# Patient Record
Sex: FEMALE | Race: BLACK OR AFRICAN AMERICAN | NOT HISPANIC OR LATINO | URBAN - METROPOLITAN AREA
[De-identification: names, ages, dates, MRNs, and addresses within clinical notes are randomized per-mention and may not be internally consistent; named-entity substitution may affect disease eponyms.]

---

## 2017-01-23 ENCOUNTER — OUTPATIENT (OUTPATIENT)
Dept: OUTPATIENT SERVICES | Facility: HOSPITAL | Age: 22
LOS: 1 days | End: 2017-01-23
Payer: COMMERCIAL

## 2017-01-23 PROCEDURE — 86480 TB TEST CELL IMMUN MEASURE: CPT

## 2017-01-23 PROCEDURE — 36415 COLL VENOUS BLD VENIPUNCTURE: CPT

## 2017-03-16 ENCOUNTER — APPOINTMENT (OUTPATIENT)
Dept: OBGYN | Facility: CLINIC | Age: 22
End: 2017-03-16

## 2017-03-16 VITALS
DIASTOLIC BLOOD PRESSURE: 72 MMHG | HEART RATE: 55 BPM | BODY MASS INDEX: 44.83 KG/M2 | WEIGHT: 253 LBS | SYSTOLIC BLOOD PRESSURE: 116 MMHG | HEIGHT: 63 IN | OXYGEN SATURATION: 98 %

## 2017-03-16 DIAGNOSIS — N64.4 MASTODYNIA: ICD-10-CM

## 2017-03-16 DIAGNOSIS — N63 UNSPECIFIED LUMP IN BREAST: ICD-10-CM

## 2017-03-16 PROBLEM — Z00.00 ENCOUNTER FOR PREVENTIVE HEALTH EXAMINATION: Noted: 2017-03-16

## 2017-03-29 ENCOUNTER — FORM ENCOUNTER (OUTPATIENT)
Age: 22
End: 2017-03-29

## 2017-03-30 ENCOUNTER — APPOINTMENT (OUTPATIENT)
Dept: ULTRASOUND IMAGING | Facility: HOSPITAL | Age: 22
End: 2017-03-30

## 2017-03-30 ENCOUNTER — OUTPATIENT (OUTPATIENT)
Dept: OUTPATIENT SERVICES | Facility: HOSPITAL | Age: 22
LOS: 1 days | End: 2017-03-30
Payer: COMMERCIAL

## 2017-03-30 PROCEDURE — 76641 ULTRASOUND BREAST COMPLETE: CPT

## 2017-04-18 ENCOUNTER — APPOINTMENT (OUTPATIENT)
Dept: OBGYN | Facility: CLINIC | Age: 22
End: 2017-04-18

## 2017-04-19 ENCOUNTER — FORM ENCOUNTER (OUTPATIENT)
Age: 22
End: 2017-04-19

## 2017-04-19 ENCOUNTER — RESULT REVIEW (OUTPATIENT)
Age: 22
End: 2017-04-19

## 2017-04-20 ENCOUNTER — OUTPATIENT (OUTPATIENT)
Dept: OUTPATIENT SERVICES | Facility: HOSPITAL | Age: 22
LOS: 1 days | End: 2017-04-20
Payer: COMMERCIAL

## 2017-04-20 PROCEDURE — 19084 BX BREAST ADD LESION US IMAG: CPT | Mod: RT

## 2017-04-20 PROCEDURE — 19083 BX BREAST 1ST LESION US IMAG: CPT | Mod: RT

## 2017-04-20 PROCEDURE — 88305 TISSUE EXAM BY PATHOLOGIST: CPT | Mod: 26

## 2017-04-20 PROCEDURE — 19084 BX BREAST ADD LESION US IMAG: CPT

## 2017-04-20 PROCEDURE — A4648: CPT

## 2017-04-20 PROCEDURE — 19083 BX BREAST 1ST LESION US IMAG: CPT

## 2017-04-20 PROCEDURE — 88305 TISSUE EXAM BY PATHOLOGIST: CPT

## 2017-05-23 ENCOUNTER — APPOINTMENT (OUTPATIENT)
Dept: OBGYN | Facility: CLINIC | Age: 22
End: 2017-05-23

## 2017-05-23 VITALS
SYSTOLIC BLOOD PRESSURE: 110 MMHG | BODY MASS INDEX: 44.3 KG/M2 | HEART RATE: 108 BPM | WEIGHT: 250 LBS | HEIGHT: 63 IN | RESPIRATION RATE: 18 BRPM | OXYGEN SATURATION: 98 % | DIASTOLIC BLOOD PRESSURE: 60 MMHG

## 2018-06-29 ENCOUNTER — APPOINTMENT (OUTPATIENT)
Dept: INTERNAL MEDICINE | Facility: CLINIC | Age: 23
End: 2018-06-29

## 2018-07-06 ENCOUNTER — APPOINTMENT (OUTPATIENT)
Dept: INTERNAL MEDICINE | Facility: CLINIC | Age: 23
End: 2018-07-06
Payer: COMMERCIAL

## 2018-07-06 VITALS — RESPIRATION RATE: 15 BRPM | HEART RATE: 72 BPM | SYSTOLIC BLOOD PRESSURE: 112 MMHG | DIASTOLIC BLOOD PRESSURE: 60 MMHG

## 2018-07-06 VITALS — WEIGHT: 250 LBS | BODY MASS INDEX: 44.29 KG/M2

## 2018-07-06 VITALS — HEIGHT: 63 IN

## 2018-07-06 DIAGNOSIS — N64.1 FAT NECROSIS OF BREAST: ICD-10-CM

## 2018-07-06 DIAGNOSIS — Z00.00 ENCOUNTER FOR GENERAL ADULT MEDICAL EXAMINATION W/OUT ABNORMAL FINDINGS: ICD-10-CM

## 2018-07-06 PROCEDURE — 99385 PREV VISIT NEW AGE 18-39: CPT

## 2018-07-06 NOTE — HEALTH RISK ASSESSMENT
[No falls in past year] : Patient reported no falls in the past year [0] : 2) Feeling down, depressed, or hopeless: Not at all (0) [[id number]] : 0

## 2018-07-06 NOTE — ASSESSMENT
[FreeTextEntry1] : Physical examination reveals an obese woman in no acute distress her weight was 250 pounds height was 5 foot 3 inches BMI 44.29 blood pressure 112/60 pulse is 72 respirations are 14 HEENT was unremarkable chest was cardiovascular exam showed no extra heart sounds or murmurs abdomen was soft and nontender extremities showed no clubbing cyanosis or edema neurologic exam was nonfocal... we will obtain immunization records from her pediatrician sometime spent discussing obesity and weight management . She will contemplate initiating a gym membership and also convincing her mind to eating less as the way to go. Slip was given for full blood tests including STDs. She was told the importance of setting up a visit to Dr. Ibanez her gynecologist for a Pap smear

## 2018-07-06 NOTE — HISTORY OF PRESENT ILLNESS
[FreeTextEntry1] : Comes to the office as a new patient to review her medications and discuss her overall health she is transitioning from her pediatrician [de-identified] : 22-year-old woman comes to the office as a new patient usually followed by her pediatrician. She has a past history of her recent biopsy of her breast showing fat necrosis and of morbid obesity she denies chest pain shortness of breath exertional dyspnea palpitations lightheadedness vertigo dizziness or syncope she has had no significant musculoskeletal complaints denies temperature chills sweats or myalgias she has had no abdominal pain nausea vomiting diarrhea constipation rectal bleeding or black stools she has not been exercising very much and her appetite has been good her weight has been relatively consistent at the level it is now

## 2018-07-06 NOTE — REVIEW OF SYSTEMS
[Fever] : no fever [Chills] : no chills [Fatigue] : no fatigue [Hot Flashes] : no hot flashes [Night Sweats] : no night sweats [Recent Change In Weight] : ~T no recent weight change [Discharge] : no discharge [Pain] : no pain [Redness] : no redness [Dryness] : no dryness  [Vision Problems] : no vision problems [Itching] : no itching [Earache] : no earache [Hearing Loss] : no hearing loss [Nosebleed] : no nosebleeds [Hoarseness] : no hoarseness [Nasal Discharge] : no nasal discharge [Sore Throat] : no sore throat [Postnasal Drip] : no postnasal drip [Chest Pain] : no chest pain [Palpitations] : no palpitations [Leg Claudication] : no leg claudication [Lower Ext Edema] : no lower extremity edema [Orthopnea] : no orthopnea [Paroysmal Nocturnal Dyspnea] : no paroysmal nocturnal dyspnea [Shortness Of Breath] : no shortness of breath [Wheezing] : no wheezing [Cough] : no cough [Dyspnea on Exertion] : no dyspnea on exertion [Abdominal Pain] : no abdominal pain [Nausea] : no nausea [Constipation] : no constipation [Diarrhea] : diarrhea [Vomiting] : no vomiting [Heartburn] : no heartburn [Melena] : no melena [Dysuria] : no dysuria [Incontinence] : no incontinence [Nocturia] : no nocturia [Poor Libido] : libido not poor [Hematuria] : no hematuria [Frequency] : no frequency [Vaginal Discharge] : no vaginal discharge [Dysmenorrhea] : no dysmenorrhea [Joint Pain] : no joint pain [Joint Stiffness] : no joint stiffness [Joint Swelling] : no joint swelling [Muscle Weakness] : no muscle weakness [Muscle Pain] : no muscle pain [Back Pain] : no back pain [Itching] : no itching [Mole Changes] : no mole changes [Nail Changes] : no nail changes [Hair Changes] : no hair changes [Skin Rash] : no skin rash [Headache] : no headache [Dizziness] : no dizziness [Fainting] : no fainting [Confusion] : no confusion [Memory Loss] : no memory loss [Unsteady Walking] : no ataxia [Suicidal] : not suicidal [Insomnia] : no insomnia [Anxiety] : no anxiety [Depression] : no depression [Easy Bleeding] : no easy bleeding [Easy Bruising] : no easy bruising [Swollen Glands] : no swollen glands

## 2020-07-19 ENCOUNTER — TRANSCRIPTION ENCOUNTER (OUTPATIENT)
Age: 25
End: 2020-07-19

## 2021-05-19 ENCOUNTER — APPOINTMENT (OUTPATIENT)
Dept: ORTHOPEDIC SURGERY | Facility: CLINIC | Age: 26
End: 2021-05-19
Payer: COMMERCIAL

## 2021-05-19 ENCOUNTER — NON-APPOINTMENT (OUTPATIENT)
Age: 26
End: 2021-05-19

## 2021-05-19 PROCEDURE — 99072 ADDL SUPL MATRL&STAF TM PHE: CPT

## 2021-05-19 PROCEDURE — 29405 APPL SHORT LEG CAST: CPT | Mod: RT

## 2021-05-19 PROCEDURE — 99204 OFFICE O/P NEW MOD 45 MIN: CPT | Mod: 25

## 2021-06-21 ENCOUNTER — APPOINTMENT (OUTPATIENT)
Dept: ORTHOPEDIC SURGERY | Facility: CLINIC | Age: 26
End: 2021-06-21
Payer: COMMERCIAL

## 2021-06-21 VITALS — HEIGHT: 63 IN | WEIGHT: 250 LBS | BODY MASS INDEX: 44.3 KG/M2

## 2021-06-21 PROCEDURE — 99072 ADDL SUPL MATRL&STAF TM PHE: CPT

## 2021-06-21 PROCEDURE — 99213 OFFICE O/P EST LOW 20 MIN: CPT

## 2021-07-12 ENCOUNTER — APPOINTMENT (OUTPATIENT)
Dept: ORTHOPEDIC SURGERY | Facility: CLINIC | Age: 26
End: 2021-07-12
Payer: COMMERCIAL

## 2021-07-12 PROCEDURE — 99072 ADDL SUPL MATRL&STAF TM PHE: CPT

## 2021-07-12 PROCEDURE — 99213 OFFICE O/P EST LOW 20 MIN: CPT

## 2021-10-07 ENCOUNTER — APPOINTMENT (OUTPATIENT)
Dept: ORTHOPEDIC SURGERY | Facility: CLINIC | Age: 26
End: 2021-10-07
Payer: COMMERCIAL

## 2021-10-07 PROCEDURE — 99214 OFFICE O/P EST MOD 30 MIN: CPT

## 2022-01-28 ENCOUNTER — OUTPATIENT (OUTPATIENT)
Dept: OUTPATIENT SERVICES | Facility: HOSPITAL | Age: 27
LOS: 1 days | End: 2022-01-28
Payer: COMMERCIAL

## 2022-01-28 ENCOUNTER — APPOINTMENT (OUTPATIENT)
Dept: MRI IMAGING | Facility: HOSPITAL | Age: 27
End: 2022-01-28
Payer: COMMERCIAL

## 2022-01-28 ENCOUNTER — NON-APPOINTMENT (OUTPATIENT)
Age: 27
End: 2022-01-28

## 2022-01-28 ENCOUNTER — APPOINTMENT (OUTPATIENT)
Dept: FAMILY MEDICINE | Facility: CLINIC | Age: 27
End: 2022-01-28
Payer: COMMERCIAL

## 2022-01-28 VITALS — SYSTOLIC BLOOD PRESSURE: 100 MMHG | DIASTOLIC BLOOD PRESSURE: 80 MMHG

## 2022-01-28 VITALS — HEART RATE: 112 BPM | TEMPERATURE: 97.6 F | OXYGEN SATURATION: 95 % | RESPIRATION RATE: 16 BRPM

## 2022-01-28 VITALS — SYSTOLIC BLOOD PRESSURE: 109 MMHG | DIASTOLIC BLOOD PRESSURE: 76 MMHG

## 2022-01-28 VITALS — WEIGHT: 218 LBS | BODY MASS INDEX: 38.62 KG/M2

## 2022-01-28 DIAGNOSIS — H93.11 TINNITUS, RIGHT EAR: ICD-10-CM

## 2022-01-28 DIAGNOSIS — Z82.49 FAMILY HISTORY OF ISCHEMIC HEART DISEASE AND OTHER DISEASES OF THE CIRCULATORY SYSTEM: ICD-10-CM

## 2022-01-28 DIAGNOSIS — Z84.89 FAMILY HISTORY OF OTHER SPECIFIED CONDITIONS: ICD-10-CM

## 2022-01-28 DIAGNOSIS — R42 DIZZINESS AND GIDDINESS: ICD-10-CM

## 2022-01-28 DIAGNOSIS — Z80.49 FAMILY HISTORY OF MALIGNANT NEOPLASM OF OTHER GENITAL ORGANS: ICD-10-CM

## 2022-01-28 PROCEDURE — A9579: CPT

## 2022-01-28 PROCEDURE — 70553 MRI BRAIN STEM W/O & W/DYE: CPT | Mod: 26

## 2022-01-28 PROCEDURE — 99204 OFFICE O/P NEW MOD 45 MIN: CPT

## 2022-01-28 PROCEDURE — 70553 MRI BRAIN STEM W/O & W/DYE: CPT

## 2022-01-29 PROBLEM — Z82.49 FAMILY HISTORY OF MYOCARDIAL INFARCTION: Status: ACTIVE | Noted: 2022-01-29

## 2022-01-29 PROBLEM — Z80.49 FAMILY HISTORY OF MALIGNANT NEOPLASM OF UTERUS: Status: ACTIVE | Noted: 2022-01-29

## 2022-01-29 PROBLEM — Z84.89 FAMILY HISTORY OF BENIGN NEOPLASM OF BRAIN: Status: ACTIVE | Noted: 2022-01-29

## 2022-01-29 PROBLEM — Z82.49 FAMILY HISTORY OF HYPERTENSION: Status: ACTIVE | Noted: 2022-01-29

## 2022-01-29 NOTE — HISTORY OF PRESENT ILLNESS
[FreeTextEntry8] : Radha presents as a new patient c/o dizziness, feeling 'off balance' +fatigue, tinnitus and hearing loss. Also c/o acid refulx. She has lost approximately 40 pounds over past several months due to nausea from acid reflux which causes decreased appetite. She had a essentially normal gadolinium enhanced MRI brain (except some mucosal thickening in sinuses) today. MRI ordered by ENT, Dr. Elton Lopez affiliated with [facility]. 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 Had her covid 19 booster 12/1/2022, Pfizer. About 2 weeks ago had one week of fevers, was tested for covid 19 and influenza via pcr, both negative. Illness resolved after 1 week. Started taking pepcid 20 mg about 4 times per week 2 weeks ago. Takes iron for heavy menses. Finds it hard to concentrate often since dizziness started, and often has headaches at the end of the day for the past 3 weeks. \par \par On ROS: Her BMs changed from daily to q 3 days normal quality however. 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

## 2022-01-29 NOTE — REVIEW OF SYSTEMS
[Patient Intake Form Reviewed] : Patient intake form was reviewed [FreeTextEntry2] : see intake form, to be scanned

## 2022-01-29 NOTE — PHYSICAL EXAM
[No Acute Distress] : no acute distress [Well Nourished] : well nourished [Well Developed] : well developed [Well-Appearing] : well-appearing [Normal Voice/Communication] : normal voice/communication [Normal Sclera/Conjunctiva] : normal sclera/conjunctiva [PERRL] : pupils equal round and reactive to light [EOMI] : extraocular movements intact [Normal Outer Ear/Nose] : the outer ears and nose were normal in appearance [Normal Oropharynx] : the oropharynx was normal [Normal TMs] : both tympanic membranes were normal [No Lymphadenopathy] : no lymphadenopathy [Supple] : supple [No Respiratory Distress] : no respiratory distress  [No Accessory Muscle Use] : no accessory muscle use [Clear to Auscultation] : lungs were clear to auscultation bilaterally [Normal Rate] : normal rate  [Regular Rhythm] : with a regular rhythm [Normal S1, S2] : normal S1 and S2 [No Edema] : there was no peripheral edema [Soft] : abdomen soft [Non Tender] : non-tender [Non-distended] : non-distended [No Masses] : no abdominal mass palpated [No HSM] : no HSM [Coordination Grossly Intact] : coordination grossly intact [No Focal Deficits] : no focal deficits [Normal Gait] : normal gait [Speech Grossly Normal] : speech grossly normal [Memory Grossly Normal] : memory grossly normal [Normal Affect] : the affect was normal [Alert and Oriented x3] : oriented to person, place, and time [Normal Mood] : the mood was normal [Normal Insight/Judgement] : insight and judgment were intact [de-identified] : no nystagmus [de-identified] : CN 2-12 grossly intact, negative Rhomberg

## 2022-01-29 NOTE — PAST MEDICAL HISTORY
[Menstruating] : hx of menstruating [Menarche Age ____] : age at menarche was [unfilled] [Definite ___ (Date)] : the last menstrual period was [unfilled] [Excessive Bleeding] : there was excessive bleeding [Total Preg ___] : G: [unfilled]

## 2022-02-03 ENCOUNTER — APPOINTMENT (OUTPATIENT)
Dept: CARDIOLOGY | Facility: CLINIC | Age: 27
End: 2022-02-03

## 2022-02-03 ENCOUNTER — NON-APPOINTMENT (OUTPATIENT)
Age: 27
End: 2022-02-03

## 2022-02-03 ENCOUNTER — LABORATORY RESULT (OUTPATIENT)
Age: 27
End: 2022-02-03

## 2022-02-03 ENCOUNTER — APPOINTMENT (OUTPATIENT)
Dept: CARDIOLOGY | Facility: CLINIC | Age: 27
End: 2022-02-03
Payer: COMMERCIAL

## 2022-02-03 VITALS
RESPIRATION RATE: 16 BRPM | OXYGEN SATURATION: 98 % | HEIGHT: 63 IN | TEMPERATURE: 97.3 F | BODY MASS INDEX: 38.45 KG/M2 | WEIGHT: 217 LBS | HEART RATE: 116 BPM

## 2022-02-03 VITALS — DIASTOLIC BLOOD PRESSURE: 82 MMHG | HEART RATE: 105 BPM | SYSTOLIC BLOOD PRESSURE: 104 MMHG

## 2022-02-03 DIAGNOSIS — R94.31 ABNORMAL ELECTROCARDIOGRAM [ECG] [EKG]: ICD-10-CM

## 2022-02-03 DIAGNOSIS — R00.0 TACHYCARDIA, UNSPECIFIED: ICD-10-CM

## 2022-02-03 PROCEDURE — 99204 OFFICE O/P NEW MOD 45 MIN: CPT

## 2022-02-03 PROCEDURE — 93000 ELECTROCARDIOGRAM COMPLETE: CPT

## 2022-02-03 NOTE — REASON FOR VISIT
[Symptom and Test Evaluation] : symptom and test evaluation [Arrhythmia/ECG Abnorrmalities] : arrhythmia/ECG abnormalities [FreeTextEntry1] : This is a 26 -year-old woman referred for cardiac evaluation. The patient was in her usual state of health until approximately October when she developed a cough. This was initially diagnosed as GERD. She then in December began having feelings of dizziness being off balance fatigue and fever. She states she was tested for Covid and was negative. She does occasionally get short of breath with walking stairs. She has had no chest pain or syncope. She states she has seen ENT physician and has had tinnitus. She states she was told she does not have vertigo. She did have an MRI of the brain which was negative. The patient has not gone for blood work recently. She has no history of hypertension diabetes smoking or alcohol use family history significant for mother with hypertension father with hypertension who had an MI in his mid to late 50s.

## 2022-02-03 NOTE — PHYSICAL EXAM
[Tachycardia] : tachycardic [S3] : an S3 was heard [General Appearance - Well Developed] : well developed [Normal Appearance] : normal appearance [Well Groomed] : well groomed [General Appearance - Well Nourished] : well nourished [No Deformities] : no deformities [General Appearance - In No Acute Distress] : no acute distress [Normal Oral Mucosa] : normal oral mucosa [No Oral Pallor] : no oral pallor [No Oral Cyanosis] : no oral cyanosis [Normal Jugular Venous A Waves Present] : normal jugular venous A waves present [Normal Jugular Venous V Waves Present] : normal jugular venous V waves present [No Jugular Venous Moreno A Waves] : no jugular venous moreno A waves [Respiration, Rhythm And Depth] : normal respiratory rhythm and effort [Exaggerated Use Of Accessory Muscles For Inspiration] : no accessory muscle use [Auscultation Breath Sounds / Voice Sounds] : lungs were clear to auscultation bilaterally [5th Left ICS - MCL] : palpated at the 5th LICS in the midclavicular line [Normal] : normal [Normal Rate] : normal [Rhythm Regular] : regular [Normal S1] : normal S1 [I] : a grade 1 [Abdomen Soft] : soft [Abdomen Tenderness] : non-tender [Abdomen Mass (___ Cm)] : no abdominal mass palpated [Abnormal Walk] : normal gait [Gait - Sufficient For Exercise Testing] : the gait was sufficient for exercise testing [Nail Clubbing] : no clubbing of the fingernails [Cyanosis, Localized] : no localized cyanosis [Petechial Hemorrhages (___cm)] : no petechial hemorrhages [Skin Color & Pigmentation] : normal skin color and pigmentation [] : no rash [No Venous Stasis] : no venous stasis [Skin Lesions] : no skin lesions [No Skin Ulcers] : no skin ulcer [No Xanthoma] : no  xanthoma was observed [Oriented To Time, Place, And Person] : oriented to person, place, and time English [Affect] : the affect was normal [Mood] : the mood was normal [No Anxiety] : not feeling anxious

## 2022-02-03 NOTE — ASSESSMENT
[FreeTextEntry1] : Patient was set up to have an echocardiogram in the office after her visit. She could not however wait to have it done and was offered an appointment but stated she didn't know when she would have time to do it.\par \par In addition she is not yet on her blood work as ordered by Dr. Dickerson because she states she "hasn't had time"\par \par In summary, the patient is a 26-year-old woman with several somatic complaints, a new onset of relative tachycardia and a new onset of an abnormal EKG. I would be concerned about a cardiac process and have expressed that to the patient.\par \par I am hopeful that she will go for a blood work and come back for her echocardiogram in a timely fashion

## 2022-02-03 NOTE — CARDIOLOGY SUMMARY
[de-identified] : 2/3/2022\par sinus tachycardia\par t wave inversions anteriorly changed from prior tracing of 2015Possibly consistent with ischemia or given agepossibly with a myocardial process

## 2022-02-08 DIAGNOSIS — K21.9 GASTRO-ESOPHAGEAL REFLUX DISEASE W/OUT ESOPHAGITIS: ICD-10-CM

## 2022-02-08 LAB
25(OH)D3 SERPL-MCNC: 5.5 NG/ML
ALBUMIN SERPL ELPH-MCNC: 3.9 G/DL
ALP BLD-CCNC: 198 U/L
ALT SERPL-CCNC: 27 U/L
ANAPLASMA PHAGOCYTOPHILIA IGG ANTIBODIES: NEGATIVE
ANAPLASMA PHAGOCYTOPHILIA IGM ANTIBODIES: NEGATIVE
ANION GAP SERPL CALC-SCNC: 12 MMOL/L
AST SERPL-CCNC: 45 U/L
BABESIA ANTIBODIES, IGG: NORMAL
BABESIA ANTIBODIES, IGM: NORMAL
BASOPHILS # BLD AUTO: 0.17 K/UL
BASOPHILS NFR BLD AUTO: 1.7 %
BILIRUB SERPL-MCNC: 0.5 MG/DL
BUN SERPL-MCNC: 6 MG/DL
CALCIUM SERPL-MCNC: 9.7 MG/DL
CHLORIDE SERPL-SCNC: 104 MMOL/L
CHOLEST SERPL-MCNC: 135 MG/DL
CO2 SERPL-SCNC: 20 MMOL/L
CREAT SERPL-MCNC: 0.91 MG/DL
EHRLICIA CHAFFEENIS IGG ANTIBODIES: NEGATIVE
EHRLICIA CHAFFEENIS IGM ANTIBODIES: NEGATIVE
EOSINOPHIL # BLD AUTO: 0.85 K/UL
EOSINOPHIL NFR BLD AUTO: 8.6 %
ESTIMATED AVERAGE GLUCOSE: 114 MG/DL
FOLATE SERPL-MCNC: 8.2 NG/ML
GLUCOSE SERPL-MCNC: 95 MG/DL
HBA1C MFR BLD HPLC: 5.6 %
HCT VFR BLD CALC: 32.3 %
HDLC SERPL-MCNC: 26 MG/DL
HGB BLD-MCNC: 9.4 G/DL
IMM GRANULOCYTES NFR BLD AUTO: 0.5 %
LDLC SERPL CALC-MCNC: 89 MG/DL
LYMPHOCYTES # BLD AUTO: 3.04 K/UL
LYMPHOCYTES NFR BLD AUTO: 30.9 %
MAN DIFF?: NORMAL
MCHC RBC-ENTMCNC: 20.3 PG
MCHC RBC-ENTMCNC: 29.1 GM/DL
MCV RBC AUTO: 69.8 FL
MONOCYTES # BLD AUTO: 1.47 K/UL
MONOCYTES NFR BLD AUTO: 14.9 %
NEUTROPHILS # BLD AUTO: 4.27 K/UL
NEUTROPHILS NFR BLD AUTO: 43.4 %
NONHDLC SERPL-MCNC: 109 MG/DL
PLATELET # BLD AUTO: 439 K/UL
POTASSIUM SERPL-SCNC: 4.1 MMOL/L
PROT SERPL-MCNC: 8 G/DL
RBC # BLD: 4.63 M/UL
RBC # FLD: 21.6 %
SODIUM SERPL-SCNC: 136 MMOL/L
TRIGL SERPL-MCNC: 97 MG/DL
TSH SERPL-ACNC: 2.01 UIU/ML
VIT B12 SERPL-MCNC: 783 PG/ML
WBC # FLD AUTO: 9.85 K/UL

## 2022-02-08 RX ORDER — FERROUS SULFATE 325(65) MG
325 (65 FE) TABLET ORAL DAILY
Qty: 90 | Refills: 0 | Status: ACTIVE | COMMUNITY
Start: 2022-02-08 | End: 1900-01-01

## 2022-02-11 ENCOUNTER — NON-APPOINTMENT (OUTPATIENT)
Age: 27
End: 2022-02-11

## 2022-03-16 ENCOUNTER — APPOINTMENT (OUTPATIENT)
Dept: FAMILY MEDICINE | Facility: CLINIC | Age: 27
End: 2022-03-16
Payer: COMMERCIAL

## 2022-03-16 VITALS
HEIGHT: 63 IN | RESPIRATION RATE: 16 BRPM | TEMPERATURE: 96.8 F | OXYGEN SATURATION: 95 % | WEIGHT: 208 LBS | HEART RATE: 98 BPM | DIASTOLIC BLOOD PRESSURE: 64 MMHG | BODY MASS INDEX: 36.86 KG/M2 | SYSTOLIC BLOOD PRESSURE: 97 MMHG

## 2022-03-16 DIAGNOSIS — R26.89 OTHER ABNORMALITIES OF GAIT AND MOBILITY: ICD-10-CM

## 2022-03-16 DIAGNOSIS — R09.81 NASAL CONGESTION: ICD-10-CM

## 2022-03-16 DIAGNOSIS — E66.01 MORBID (SEVERE) OBESITY DUE TO EXCESS CALORIES: ICD-10-CM

## 2022-03-16 DIAGNOSIS — D64.9 ANEMIA, UNSPECIFIED: ICD-10-CM

## 2022-03-16 PROCEDURE — 99214 OFFICE O/P EST MOD 30 MIN: CPT

## 2022-03-16 NOTE — HISTORY OF PRESENT ILLNESS
[FreeTextEntry8] : Presents for f/u feeling "off balance" and pain in both ears. No seasonal allergies. Ear pain on and off x since this past Thursday. +phlegm in throat, No fever, no n/v/d. Mom is sick with upper respiratory illness, so far negative for covid. Recently saw ENT, Gastroenterologist, Hematologist. Has appointment Dr. Jamie Mejia neurology, next week -[facility]. No other symptoms associated with ears feeling clogged on and off. \par \par ROS: negative except as noted above\par

## 2022-03-16 NOTE — ASSESSMENT
[FreeTextEntry1] : continue iron supplements\par blood work reviewed-anemia +\par referral to PT for balance\par MRI brain reviewed during visit with patient- ethmoid and sphenoid sinus congestion\par RTO 1-2 months for f/u

## 2022-03-16 NOTE — PHYSICAL EXAM
[No Acute Distress] : no acute distress [Well Nourished] : well nourished [Well Developed] : well developed [Well-Appearing] : well-appearing [Normal Voice/Communication] : normal voice/communication [Normal Sclera/Conjunctiva] : normal sclera/conjunctiva [Normal Outer Ear/Nose] : the outer ears and nose were normal in appearance [Normal Oropharynx] : the oropharynx was normal [No Lymphadenopathy] : no lymphadenopathy [Supple] : supple [No Respiratory Distress] : no respiratory distress  [No Accessory Muscle Use] : no accessory muscle use [Clear to Auscultation] : lungs were clear to auscultation bilaterally [Normal Rate] : normal rate  [Regular Rhythm] : with a regular rhythm [Normal S1, S2] : normal S1 and S2 [No Edema] : there was no peripheral edema [Speech Grossly Normal] : speech grossly normal [Memory Grossly Normal] : memory grossly normal [Normal Affect] : the affect was normal [Alert and Oriented x3] : oriented to person, place, and time [Normal Mood] : the mood was normal [Normal Insight/Judgement] : insight and judgment were intact [de-identified] : left TM wnl, right TM +fluid

## 2022-03-17 RX ORDER — FLUTICASONE PROPIONATE 50 UG/1
50 SPRAY, METERED NASAL TWICE DAILY
Qty: 1 | Refills: 6 | Status: DISCONTINUED | COMMUNITY
Start: 2022-03-16 | End: 2022-03-17

## 2022-03-21 ENCOUNTER — TRANSCRIPTION ENCOUNTER (OUTPATIENT)
Age: 27
End: 2022-03-21

## 2022-03-21 ENCOUNTER — APPOINTMENT (OUTPATIENT)
Dept: CT IMAGING | Facility: HOSPITAL | Age: 27
End: 2022-03-21
Payer: COMMERCIAL

## 2022-03-21 ENCOUNTER — OUTPATIENT (OUTPATIENT)
Dept: OUTPATIENT SERVICES | Facility: HOSPITAL | Age: 27
LOS: 1 days | End: 2022-03-21
Payer: COMMERCIAL

## 2022-03-21 DIAGNOSIS — Z00.8 ENCOUNTER FOR OTHER GENERAL EXAMINATION: ICD-10-CM

## 2022-03-21 PROCEDURE — 74177 CT ABD & PELVIS W/CONTRAST: CPT | Mod: 26

## 2022-03-21 PROCEDURE — 74177 CT ABD & PELVIS W/CONTRAST: CPT

## 2022-03-25 ENCOUNTER — APPOINTMENT (OUTPATIENT)
Dept: NEUROLOGY | Facility: CLINIC | Age: 27
End: 2022-03-25

## 2022-04-05 ENCOUNTER — APPOINTMENT (OUTPATIENT)
Dept: HEMATOLOGY ONCOLOGY | Facility: CLINIC | Age: 27
End: 2022-04-05

## 2022-04-13 ENCOUNTER — APPOINTMENT (OUTPATIENT)
Dept: NEUROLOGY | Facility: CLINIC | Age: 27
End: 2022-04-13

## 2022-04-19 ENCOUNTER — NON-APPOINTMENT (OUTPATIENT)
Age: 27
End: 2022-04-19

## 2022-04-19 ENCOUNTER — APPOINTMENT (OUTPATIENT)
Dept: OTOLARYNGOLOGY | Facility: CLINIC | Age: 27
End: 2022-04-19
Payer: COMMERCIAL

## 2022-04-19 VITALS
HEIGHT: 63 IN | HEART RATE: 78 BPM | RESPIRATION RATE: 15 BRPM | BODY MASS INDEX: 35.97 KG/M2 | OXYGEN SATURATION: 99 % | WEIGHT: 203 LBS | SYSTOLIC BLOOD PRESSURE: 108 MMHG | DIASTOLIC BLOOD PRESSURE: 77 MMHG

## 2022-04-19 DIAGNOSIS — R26.89 OTHER ABNORMALITIES OF GAIT AND MOBILITY: ICD-10-CM

## 2022-04-19 LAB
25(OH)D3 SERPL-MCNC: 28.8 NG/ML
BASOPHILS # BLD AUTO: 0.14 K/UL
BASOPHILS NFR BLD AUTO: 1.6 %
EOSINOPHIL # BLD AUTO: 0.4 K/UL
EOSINOPHIL NFR BLD AUTO: 4.4 %
HCT VFR BLD CALC: 33.7 %
HGB BLD-MCNC: 10.4 G/DL
IMM GRANULOCYTES NFR BLD AUTO: 0.7 %
IRON SATN MFR SERPL: 7 %
IRON SERPL-MCNC: 24 UG/DL
LYMPHOCYTES # BLD AUTO: 3.17 K/UL
LYMPHOCYTES NFR BLD AUTO: 35.1 %
MAGNESIUM SERPL-MCNC: 2.3 MG/DL
MAN DIFF?: NORMAL
MCHC RBC-ENTMCNC: 22.8 PG
MCHC RBC-ENTMCNC: 30.9 GM/DL
MCV RBC AUTO: 73.7 FL
MONOCYTES # BLD AUTO: 1.24 K/UL
MONOCYTES NFR BLD AUTO: 13.7 %
NEUTROPHILS # BLD AUTO: 4.01 K/UL
NEUTROPHILS NFR BLD AUTO: 44.5 %
PLATELET # BLD AUTO: 479 K/UL
RBC # BLD: 4.57 M/UL
RBC # FLD: 17.7 %
TIBC SERPL-MCNC: 345 UG/DL
UIBC SERPL-MCNC: 321 UG/DL
WBC # FLD AUTO: 9.02 K/UL

## 2022-04-19 PROCEDURE — 92504 EAR MICROSCOPY EXAMINATION: CPT

## 2022-04-19 PROCEDURE — 99204 OFFICE O/P NEW MOD 45 MIN: CPT

## 2022-04-19 RX ORDER — COLD-HOT PACK
125 MCG EACH MISCELLANEOUS
Qty: 90 | Refills: 1 | Status: ACTIVE | COMMUNITY
Start: 2022-04-19 | End: 1900-01-01

## 2022-04-19 RX ORDER — CHOLECALCIFEROL (VITAMIN D3) 1250 MCG
1.25 MG CAPSULE ORAL
Qty: 8 | Refills: 1 | Status: DISCONTINUED | COMMUNITY
Start: 2022-02-08 | End: 2022-04-19

## 2022-04-19 RX ORDER — MOMETASONE 50 UG/1
50 SPRAY, METERED NASAL TWICE DAILY
Qty: 1 | Refills: 6 | Status: DISCONTINUED | COMMUNITY
Start: 2022-03-17 | End: 2022-04-19

## 2022-04-20 PROBLEM — R26.89 LOSS OF BALANCE: Status: ACTIVE | Noted: 2022-03-16

## 2022-04-20 NOTE — PROCEDURE
[Vertigo] : Vertigo [Same] : same as the Pre Op Dx. [] : Binocular Microscopy [FreeTextEntry4] : none [FreeTextEntry6] : Operative microscope was used to examine the ear canal, ear drum and visible middle ear landmarks. Adequate exam would not have been possible without the use of a microscope. Findings are described.

## 2022-04-20 NOTE — REASON FOR VISIT
[Initial Consultation] : an initial consultation for [Other: _____] : [unfilled] [FreeTextEntry2] : referred by Dr. Jamie Mejia for bilateral tinnitus and dizziness

## 2022-04-20 NOTE — DATA REVIEWED
[de-identified] : I have independently reviewed the patient's audiogram from january 2022 and my findings include 6k notch bilaterally and R increased thresholds

## 2022-04-20 NOTE — HISTORY OF PRESENT ILLNESS
[de-identified] : 26 year old woman referred by Dr. Jamie Mejia for bilateral tinnitus and dizziness that started in 11/2021\par COVID vaccinated Phizer 3rd shot 12/06/2021 \par Hx--was informed she had Mono sometime in her lifetime back in 01/2022\par Reports she recently lost 60 pounds due to nausea and vomiting that started in 11/2021 and is now on Pepcid which has helped. Reports she also has an incredibly low BP, elevated HR and frequent mucus production.\par Reports intermittent bilateral otalgia when loud noises are present.\par Reports intermittent changes in hearing.\par Patient describes “dizzy” as swaying, bobbing, lightheaded. Mother reports that Dr. Mejia stated that she walks like a "drunken "-- completely unable to walk in a straight line. \par Patient feels off balance only with walking or moving\par States changes in motion, rolling in bed, getting up quickly bring it on\par Discrete episodes\par Last for a few seconds\par Reports it happens constantly when her head is not resting against something.\par Last audiogram done 01/06/2022\par Denies any prior treatments\par Denies history of headaches/migraines and motion sickness\par Denies family history of vertigo, headaches/migraines and motion sickness\par Denies otorrhea, headaches related to hearing, recent fevers or infections.

## 2022-04-21 ENCOUNTER — LABORATORY RESULT (OUTPATIENT)
Age: 27
End: 2022-04-21

## 2022-04-25 ENCOUNTER — NON-APPOINTMENT (OUTPATIENT)
Age: 27
End: 2022-04-25

## 2022-04-25 LAB
C3 SERPL-MCNC: 192 MG/DL
C4 SERPL-MCNC: 25 MG/DL
ENA SCL70 IGG SER IA-ACNC: <0.2 AL
ENA SS-A AB SER IA-ACNC: <0.2 AL
ENA SS-B AB SER IA-ACNC: <0.2 AL
ERYTHROCYTE [SEDIMENTATION RATE] IN BLOOD BY WESTERGREN METHOD: 120 MM/HR
RHEUMATOID FACT SER QL: <10 IU/ML

## 2022-04-26 ENCOUNTER — NON-APPOINTMENT (OUTPATIENT)
Age: 27
End: 2022-04-26

## 2022-04-26 LAB
ANA PAT FLD IF-IMP: ABNORMAL
ANACR T: ABNORMAL

## 2022-04-28 ENCOUNTER — TRANSCRIPTION ENCOUNTER (OUTPATIENT)
Age: 27
End: 2022-04-28

## 2022-05-09 ENCOUNTER — APPOINTMENT (OUTPATIENT)
Dept: NEUROLOGY | Facility: CLINIC | Age: 27
End: 2022-05-09

## 2022-05-12 ENCOUNTER — APPOINTMENT (OUTPATIENT)
Dept: ORTHOPEDIC SURGERY | Facility: CLINIC | Age: 27
End: 2022-05-12

## 2022-05-12 ENCOUNTER — NON-APPOINTMENT (OUTPATIENT)
Age: 27
End: 2022-05-12

## 2022-05-20 ENCOUNTER — APPOINTMENT (OUTPATIENT)
Dept: OTOLARYNGOLOGY | Facility: CLINIC | Age: 27
End: 2022-05-20
Payer: COMMERCIAL

## 2022-05-20 PROCEDURE — 92547 SUPPLEMENTAL ELECTRICAL TEST: CPT

## 2022-05-20 PROCEDURE — 92517 VEMP TEST I&R CERVICAL: CPT

## 2022-05-20 PROCEDURE — 92567 TYMPANOMETRY: CPT

## 2022-05-20 PROCEDURE — 92540 BASIC VESTIBULAR EVALUATION: CPT

## 2022-05-20 PROCEDURE — 92537 CALORIC VSTBLR TEST W/REC: CPT

## 2022-05-26 ENCOUNTER — APPOINTMENT (OUTPATIENT)
Dept: OTOLARYNGOLOGY | Facility: CLINIC | Age: 27
End: 2022-05-26
Payer: COMMERCIAL

## 2022-05-26 VITALS
WEIGHT: 203 LBS | SYSTOLIC BLOOD PRESSURE: 107 MMHG | BODY MASS INDEX: 35.97 KG/M2 | DIASTOLIC BLOOD PRESSURE: 73 MMHG | HEIGHT: 63 IN | HEART RATE: 73 BPM

## 2022-05-26 DIAGNOSIS — R42 DIZZINESS AND GIDDINESS: ICD-10-CM

## 2022-05-26 PROCEDURE — 99213 OFFICE O/P EST LOW 20 MIN: CPT

## 2022-05-26 PROCEDURE — 92504 EAR MICROSCOPY EXAMINATION: CPT

## 2022-05-26 RX ORDER — CHROMIUM 200 MCG
TABLET ORAL
Refills: 0 | Status: COMPLETED | COMMUNITY
End: 2022-05-26

## 2022-05-27 ENCOUNTER — APPOINTMENT (OUTPATIENT)
Dept: ORTHOPEDIC SURGERY | Facility: CLINIC | Age: 27
End: 2022-05-27
Payer: COMMERCIAL

## 2022-05-27 DIAGNOSIS — S86.011D STRAIN OF RIGHT ACHILLES TENDON, SUBSEQUENT ENCOUNTER: ICD-10-CM

## 2022-05-27 PROCEDURE — 99213 OFFICE O/P EST LOW 20 MIN: CPT

## 2022-05-28 PROBLEM — R42 DIZZINESS: Status: ACTIVE | Noted: 2022-01-28

## 2022-05-28 NOTE — CONSULT LETTER
[Dear  ___] : Dear  [unfilled], [Consult Letter:] : I had the pleasure of evaluating your patient, [unfilled]. [Please see my note below.] : Please see my note below. [Consult Closing:] : Thank you very much for allowing me to participate in the care of this patient.  If you have any questions, please do not hesitate to contact me. [Sincerely,] : Sincerely, [FreeTextEntry2] : Dr. Jamie Mejia [FreeTextEntry3] : Russel oJ MD, Otology Neurotology & Skull Base Surgery

## 2022-05-28 NOTE — HISTORY OF PRESENT ILLNESS
[de-identified] : 26 year old female here for follow up for  bilateral tinnitus and dizziness\par Reports tinnitus and dizziness are about the same since last visit. \par Completed VNG 5/20/2022. Results: Abnormal VNG- Bithermal water caloric irrigations yielded responses consistent  with bilateral hypofunction. \par Reports otalgia has resolved since last visit-states she feels "tolerable" discomfort \par Reports continued intermittent changes in hearing\par Reports continued swaying sensation and lightheaded throughout the day. \par Reports dizziness is worse with changes in position-resolves when patient remains still. \par Hearing is stable-Last audiogram done 01/06/2022\par Denies history of headaches/migraines and motion sickness\par Denies family history of vertigo, headaches/migraines and motion sickness\par Denies otorrhea, headaches related to hearing, recent fevers or infections.

## 2022-05-28 NOTE — PHYSICAL EXAM
[Midline] : trachea located in midline position [Binocular Microscopic Exam] : Binocular microscopic exam was performed [Rinne Test Air Conduction Persists > Bone Conduction Right] : air conduction greater than bone conduction on the right [Rinne Test Air Conduction Persists > Bone Conduction Left] : air conduction greater than bone conduction on the left [Hearing Livingston Test (Tuning Fork On Forehead)] : no lateralization of tone [Normal] : gait was normal [Nystagmus] : ~T ~M nystagmus was seen [Fukuda Step Test] : Fukuda Step Test was Positive [Past-Pointing] : Past-Pointing: Positive [Hearing Loss Right Only] : normal [Hearing Loss Left Only] : normal [Romberg's Sign] : Romberg's sign was absent [Fistula Sign] : Fistula Sign: Negative [Vanesa-Hallevertke] : Provo-Hallpike: Negative [FreeTextEntry1] : unsteady on fukuda, + tremor/past-point + lateral gaze nyst to L extingushes

## 2022-05-28 NOTE — REASON FOR VISIT
[Initial Consultation] : an initial consultation for [Other: _____] : [unfilled] [FreeTextEntry2] : bilateral tinnitus and dizziness

## 2022-06-03 ENCOUNTER — NON-APPOINTMENT (OUTPATIENT)
Age: 27
End: 2022-06-03

## 2022-06-28 ENCOUNTER — NON-APPOINTMENT (OUTPATIENT)
Age: 27
End: 2022-06-28

## 2022-06-29 ENCOUNTER — NON-APPOINTMENT (OUTPATIENT)
Age: 27
End: 2022-06-29

## 2022-06-29 ENCOUNTER — APPOINTMENT (OUTPATIENT)
Dept: RHEUMATOLOGY | Facility: CLINIC | Age: 27
End: 2022-06-29

## 2022-06-29 VITALS
SYSTOLIC BLOOD PRESSURE: 108 MMHG | HEIGHT: 63 IN | BODY MASS INDEX: 36.5 KG/M2 | TEMPERATURE: 97.2 F | WEIGHT: 206 LBS | OXYGEN SATURATION: 98 % | DIASTOLIC BLOOD PRESSURE: 72 MMHG | HEART RATE: 102 BPM

## 2022-06-29 DIAGNOSIS — E55.9 VITAMIN D DEFICIENCY, UNSPECIFIED: ICD-10-CM

## 2022-06-29 PROCEDURE — 99204 OFFICE O/P NEW MOD 45 MIN: CPT

## 2022-06-30 RX ORDER — HYDROCORTISONE 25 MG/G
2.5 CREAM TOPICAL
Qty: 30 | Refills: 0 | Status: COMPLETED | COMMUNITY
Start: 2022-06-16

## 2022-06-30 NOTE — ASSESSMENT
[FreeTextEntry1] : SAMMIE CASTANON is a 26 year old woman who presents with below -- \par \par # b/l vestibular hypofunction with markedly elevated ESR even into April s/p months of sx and already clinically improving. Unclear etiology but certainly warrants w/u. + BARNEY as well tho paucity of most common CTD sx. Potential for atypical autoimmune process still high but harder to test for - ie Cogans \par - trend ESR, check for paraproteinemia \par - round out serologies as below\par - will check with FARIBA if any role for trial of low dose steroids - we discussed prednisone 10mg today \par - c/w vestibular PT \par \par # low Vit D\par - improved, c/w maintenance of 1000 IU daily \par \par TEB in 2-3 weeks to review \par

## 2022-06-30 NOTE — HISTORY OF PRESENT ILLNESS
[FreeTextEntry1] : SAMMIE CASTANON is a 26 year old woman who presents with + BARNEY,  and dx of b/l vestibular hypofunction -- Oct 2021 -- cough, regurgitating food, high HR to 110s -- tried to be treated for GERD but then in sx recurred in Dec, also having issues with delayed vision when moving head, dizziness, worsening fatigue, R sided tinnitus with low hearing in upper range. \par \par CT chest - normal \par EGD - negative for GERD\par Supplemented low iron and Vit D \par Ultimately had 60 lb unintentional weight loss, s/p CT abd which was normal - now regaining the weight \par Eventually ended up with b/l tinnitus and muffled hearing \par + Caloric test -- b/l vestibular dysfunction\par Can now eat normal again, no GERD \par PT is helping with gait and hearing is muffled but improved\par Never started on steroids \par \par SLE ROS negative for alopecia, sicca, salivary gland swelling, oral ulcers, malar rash, photosensitivity, SOB, chest pain, serositis, abd pain, dysuria, hematuria, rash, joint AM stiffness/synovitis, hematologic abnormalities, Raynauds. APLS ROS - no pregnancy, no miscarriage, no thrombotic events. \par \par Inflammatory arthritis ROS negative for symmetrical peripheral joint synovitis, prolonged AM stiffness, enthesitis, dactylitis, psoriasis/ rashes, eye inflammation, inflammatory low back pain, IBD. \par \par Negative FH for autoimmune d/o \par \par Labs - as above, improved Vit D\par Negative - dsDNA, TRAVIS, Sjogrens, RF, Scl-70, C3/4

## 2022-06-30 NOTE — PHYSICAL EXAM
[General Appearance - Alert] : alert [General Appearance - In No Acute Distress] : in no acute distress [General Appearance - Well Nourished] : well nourished [Sclera] : the sclera and conjunctiva were normal [PERRL With Normal Accommodation] : pupils were equal in size, round, and reactive to light [Extraocular Movements] : extraocular movements were intact [Oropharynx] : the oropharynx was normal [Neck Appearance] : the appearance of the neck was normal [Auscultation Breath Sounds / Voice Sounds] : lungs were clear to auscultation bilaterally [Heart Rate And Rhythm] : heart rate was normal and rhythm regular [Heart Sounds] : normal S1 and S2 [Edema] : there was no peripheral edema [No Spinal Tenderness] : no spinal tenderness [Musculoskeletal - Swelling] : no joint swelling seen [] : no rash [FreeTextEntry1] : Irregular gait  [Oriented To Time, Place, And Person] : oriented to person, place, and time [Impaired Insight] : insight and judgment were intact [Affect] : the affect was normal

## 2022-07-01 ENCOUNTER — LABORATORY RESULT (OUTPATIENT)
Age: 27
End: 2022-07-01

## 2022-07-01 LAB
25(OH)D3 SERPL-MCNC: 47.2 NG/ML
ALBUMIN SERPL ELPH-MCNC: 4.2 G/DL
ALP BLD-CCNC: 163 U/L
ALT SERPL-CCNC: 69 U/L
ANION GAP SERPL CALC-SCNC: 11 MMOL/L
AST SERPL-CCNC: 74 U/L
BASOPHILS # BLD AUTO: 0.12 K/UL
BASOPHILS NFR BLD AUTO: 1.5 %
BILIRUB SERPL-MCNC: 0.3 MG/DL
BUN SERPL-MCNC: 10 MG/DL
CALCIUM SERPL-MCNC: 10.1 MG/DL
CHLORIDE SERPL-SCNC: 104 MMOL/L
CO2 SERPL-SCNC: 23 MMOL/L
CREAT SERPL-MCNC: 0.87 MG/DL
CRP SERPL-MCNC: 8 MG/L
EGFR: 93 ML/MIN/1.73M2
EOSINOPHIL # BLD AUTO: 0.25 K/UL
EOSINOPHIL NFR BLD AUTO: 3.1 %
ERYTHROCYTE [SEDIMENTATION RATE] IN BLOOD BY WESTERGREN METHOD: 110 MM/HR
FERRITIN SERPL-MCNC: 28 NG/ML
GLUCOSE SERPL-MCNC: 102 MG/DL
HCT VFR BLD CALC: 33.6 %
HGB BLD-MCNC: 10.7 G/DL
IMM GRANULOCYTES NFR BLD AUTO: 0.4 %
LYMPHOCYTES # BLD AUTO: 2.73 K/UL
LYMPHOCYTES NFR BLD AUTO: 34.4 %
MAN DIFF?: NORMAL
MCHC RBC-ENTMCNC: 24.7 PG
MCHC RBC-ENTMCNC: 31.8 GM/DL
MCV RBC AUTO: 77.6 FL
MONOCYTES # BLD AUTO: 0.95 K/UL
MONOCYTES NFR BLD AUTO: 12 %
NEUTROPHILS # BLD AUTO: 3.86 K/UL
NEUTROPHILS NFR BLD AUTO: 48.6 %
PLATELET # BLD AUTO: 420 K/UL
POTASSIUM SERPL-SCNC: 4.1 MMOL/L
PROT SERPL-MCNC: 8.3 G/DL
RBC # BLD: 4.33 M/UL
RBC # FLD: 16.6 %
SODIUM SERPL-SCNC: 138 MMOL/L
WBC # FLD AUTO: 7.94 K/UL

## 2022-07-03 LAB
24R-OH-CALCIDIOL SERPL-MCNC: 44.2 PG/ML
C3 SERPL-MCNC: 193 MG/DL
C4 SERPL-MCNC: 24 MG/DL
CCP AB SER IA-ACNC: 91 UNITS
RF+CCP IGG SER-IMP: ABNORMAL
THYROGLOB AB SERPL-ACNC: <20 IU/ML
THYROPEROXIDASE AB SERPL IA-ACNC: <10 IU/ML

## 2022-07-05 LAB — ACE BLD-CCNC: 116 U/L

## 2022-07-06 LAB
CENTROMERE IGG SER-ACNC: <0.2 CD:130001892
GBM AB TITR SER IF: <0.2
HISTONE AB SER QL: 0.6 UNITS
RIBOSOMAL P AB SER IA-ACNC: <0.2 AL
RNA POLYMERASE III IGG: 12 UNITS

## 2022-07-07 LAB
ALBUMIN MFR SERPL ELPH: 46.2 %
ALBUMIN SERPL-MCNC: 3.8 G/DL
ALBUMIN/GLOB SERPL: 0.8 RATIO
ALBUPE: 17.4 %
ALPHA1 GLOB MFR SERPL ELPH: 4.1 %
ALPHA1 GLOB SERPL ELPH-MCNC: 0.3 G/DL
ALPHA1UPE: 29.4 %
ALPHA2 GLOB MFR SERPL ELPH: 10.6 %
ALPHA2 GLOB SERPL ELPH-MCNC: 0.9 G/DL
ALPHA2UPE: 21.3 %
B-GLOBULIN MFR SERPL ELPH: 14.2 %
B-GLOBULIN SERPL ELPH-MCNC: 1.2 G/DL
BETAUPE: 19.9 %
GAMMA GLOB FLD ELPH-MCNC: 2.1 G/DL
GAMMA GLOB MFR SERPL ELPH: 24.9 %
GAMMAUPE: 12 %
IGA 24H UR QL IFE: NORMAL
IGG SUBSET TOTAL IGG: 2262 MG/DL
IGG1 SER-MCNC: 1330 MG/DL
IGG2 SER-MCNC: 350 MG/DL
IGG3 SER-MCNC: 170 MG/DL
IGG4 SER-MCNC: 30 MG/DL
INTERPRETATION SERPL IEP-IMP: NORMAL
KAPPA LC 24H UR QL: NORMAL
PROT PATTERN 24H UR ELPH-IMP: NORMAL
PROT SERPL-MCNC: 8.3 G/DL
PROT SERPL-MCNC: 8.3 G/DL
PROT UR-MCNC: 14 MG/DL
PROT UR-MCNC: 14 MG/DL

## 2022-07-21 ENCOUNTER — APPOINTMENT (OUTPATIENT)
Dept: RHEUMATOLOGY | Facility: CLINIC | Age: 27
End: 2022-07-21

## 2022-07-21 PROCEDURE — 99214 OFFICE O/P EST MOD 30 MIN: CPT | Mod: 95

## 2022-07-21 NOTE — REASON FOR VISIT
[Home] : at home, [unfilled] , at the time of the visit. [Medical Office: ([facility])___] : at the medical office located in  [Patient] : the patient [Self] : self [Follow-Up: _____] : a [unfilled] follow-up visit [FreeTextEntry1] : b/l vestibular hypofunction, ESR

## 2022-07-21 NOTE — ASSESSMENT
[FreeTextEntry1] : SAMMIE CASTANON is a 26 year old woman who presents with below -- \par \par # b/l vestibular hypofunction with markedly elevated ESR even into April s/p months of sx and continues to clinically improve. Unclear etiology. \par - + BARNEY tho paucity of most common CTD sx. \par - + CCP but negative RF and no sx consistent with RA\par - + ACE, normal Vit D 1,25, CT a/p in March with diffuse shotty abd LAD, CXR from Dec reportedly normal and will get me record of that for review\par - extensively reviewed remainder of labs with her and rest normal aside from rising LFTs tho no liver sx - ?betahistine on top of her prior dx of fatty liver tho this was never dx with imaging \par - regardless at present no role for medication given improving but reviewed sx to monitor for and recommend q6 month rheum surveillance \par - repeat labs as below in 6-8 weeks from prior, will add AIH labs too \par - if rising LFTs will need RUQ US vs CT and possibly Hepatology/ biopsy or liver or LN \par - c/w vestibular PT \par \par # low Vit D\par - resolved, c/w maintenance of 1000 IU daily \par \par TEB in 2 months \par

## 2022-07-21 NOTE — PHYSICAL EXAM
[General Appearance - Alert] : alert [General Appearance - In No Acute Distress] : in no acute distress [General Appearance - Well Nourished] : well nourished [Sclera] : the sclera and conjunctiva were normal [Extraocular Movements] : extraocular movements were intact [Neck Appearance] : the appearance of the neck was normal [] : no respiratory distress [Musculoskeletal - Swelling] : no joint swelling seen [Oriented To Time, Place, And Person] : oriented to person, place, and time [Impaired Insight] : insight and judgment were intact [Affect] : the affect was normal [FreeTextEntry1] : acne scaring on face, no inflammatory rashes

## 2022-07-21 NOTE — HISTORY OF PRESENT ILLNESS
[FreeTextEntry1] : SAMMIE CASTANON is a 26 year old woman who presents with + BARNEY,  and dx of b/l vestibular hypofunction -- Oct 2021 -- cough, regurgitating food, high HR to 110s -- tried to be treated for GERD but then in sx recurred in Dec, also having issues with delayed vision when moving head, dizziness, worsening fatigue, R sided tinnitus with low hearing in upper range. \par \par CT chest - normal \par EGD - negative for GERD\par Supplemented low iron and Vit D \par Ultimately had 60 lb unintentional weight loss, s/p CT abd which was normal - now regaining the weight \par Eventually ended up with b/l tinnitus and muffled hearing \par + Caloric test -- b/l vestibular dysfunction\par Can now eat normal again, no GERD \par PT is helping with gait and hearing is muffled but improved\par Never started on steroids \par \par SLE ROS negative for alopecia, sicca, salivary gland swelling, oral ulcers, malar rash, photosensitivity, SOB, chest pain, serositis, abd pain, dysuria, hematuria, rash, joint AM stiffness/synovitis, hematologic abnormalities, Raynauds. APLS ROS - no pregnancy, no miscarriage, no thrombotic events. \par \par Inflammatory arthritis ROS negative for symmetrical peripheral joint synovitis, prolonged AM stiffness, enthesitis, dactylitis, psoriasis/ rashes, eye inflammation, inflammatory low back pain, IBD. \par \par Negative FH for autoimmune d/o \par \par Labs - as above, improved Vit D\par Negative - dsDNA, TRAVIS, Sjogrens, RF, Scl-70, C3/4\par \par ---------------\par 7/21/22 -- Feeling well since last visit, no new symptoms, balance improving, no falls, will be having ENT f/u soon as well.

## 2022-08-03 ENCOUNTER — APPOINTMENT (OUTPATIENT)
Dept: OTOLARYNGOLOGY | Facility: CLINIC | Age: 27
End: 2022-08-03

## 2022-08-03 PROCEDURE — 99441: CPT

## 2022-08-15 LAB
ALBUMIN SERPL ELPH-MCNC: 4 G/DL
ALP BLD-CCNC: 138 U/L
ALT SERPL-CCNC: 59 U/L
ANION GAP SERPL CALC-SCNC: 10 MMOL/L
AST SERPL-CCNC: 60 U/L
BILIRUB SERPL-MCNC: 0.2 MG/DL
BUN SERPL-MCNC: 10 MG/DL
CALCIUM SERPL-MCNC: 9.5 MG/DL
CHLORIDE SERPL-SCNC: 104 MMOL/L
CO2 SERPL-SCNC: 23 MMOL/L
CREAT SERPL-MCNC: 0.96 MG/DL
CRP SERPL-MCNC: 10 MG/L
EGFR: 83 ML/MIN/1.73M2
ERYTHROCYTE [SEDIMENTATION RATE] IN BLOOD BY WESTERGREN METHOD: 76 MM/HR
GGT SERPL-CCNC: 87 U/L
GLUCOSE SERPL-MCNC: 95 MG/DL
POTASSIUM SERPL-SCNC: 4.4 MMOL/L
PROT SERPL-MCNC: 7.5 G/DL
SODIUM SERPL-SCNC: 137 MMOL/L

## 2022-08-16 LAB
LKM AB SER QL IF: <20.1 UNITS
MITOCHONDRIA AB SER IF-ACNC: NORMAL
SMOOTH MUSCLE AB SER QL IF: ABNORMAL

## 2022-09-14 ENCOUNTER — APPOINTMENT (OUTPATIENT)
Dept: RHEUMATOLOGY | Facility: CLINIC | Age: 27
End: 2022-09-14

## 2022-09-14 DIAGNOSIS — R70.0 ELEVATED ERYTHROCYTE SEDIMENTATION RATE: ICD-10-CM

## 2022-09-14 PROCEDURE — 99213 OFFICE O/P EST LOW 20 MIN: CPT | Mod: 95

## 2022-09-14 NOTE — ASSESSMENT
[FreeTextEntry1] : SAMMIE CASTANON is a 26 year old woman with below -- \par \par # b/l vestibular hypofunction with markedly elevated ESR even into April s/p months of sx and continues to clinically improve. Unclear etiology. Downtrending ESR. \par - + BARNEY tho paucity of most common CTD sx. \par - + CCP but negative RF and no sx consistent with RA\par - + ACE, normal Vit D 1,25, CT a/p in March with diffuse shotty abd LAD, CXR from Dec reportedly normal --she will get me record of that for review\par - LFTs downtrending, borderline smooth muscle Ab and elevated GGT tho asymptomatic - ?betahistine on top of her prior dx of fatty liver tho this was never dx with imaging -- will repeat levels in 3 months from prior and get US RUQ \par - at present no role for medication given improving\par - c/w vestibular PT \par \par # low Vit D\par - resolved, c/w maintenance of 1000 IU daily \par \par TEB in 3-4 months, if everything remains stable can transition to q 6 months surveillance visits at that time \par

## 2022-09-14 NOTE — HISTORY OF PRESENT ILLNESS
[FreeTextEntry1] : SAMMIE CASTANON is a 26 year old woman who presents with + BARNEY,  and dx of b/l vestibular hypofunction -- Oct 2021 -- cough, regurgitating food, high HR to 110s -- tried to be treated for GERD but then in sx recurred in Dec, also having issues with delayed vision when moving head, dizziness, worsening fatigue, R sided tinnitus with low hearing in upper range. \par \par CT chest - normal \par EGD - negative for GERD\par Supplemented low iron and Vit D \par Ultimately had 60 lb unintentional weight loss, s/p CT abd which was normal - now regaining the weight \par Eventually ended up with b/l tinnitus and muffled hearing \par + Caloric test -- b/l vestibular dysfunction\par Can now eat normal again, no GERD \par PT is helping with gait and hearing is muffled but improved\par Never started on steroids \par \par SLE ROS negative for alopecia, sicca, salivary gland swelling, oral ulcers, malar rash, photosensitivity, SOB, chest pain, serositis, abd pain, dysuria, hematuria, rash, joint AM stiffness/synovitis, hematologic abnormalities, Raynauds. APLS ROS - no pregnancy, no miscarriage, no thrombotic events. \par \par Inflammatory arthritis ROS negative for symmetrical peripheral joint synovitis, prolonged AM stiffness, enthesitis, dactylitis, psoriasis/ rashes, eye inflammation, inflammatory low back pain, IBD. \par \par + R full Achilles tendon rupture in 2021, too late for sx, improved symptomatically \par Negative FH for autoimmune d/o \par \par Labs - as above, improved Vit D\par Negative - dsDNA, TRAVIS, Sjogrens, RF, Scl-70, C3/4\par \par ---------------\par 7/21/22 -- Feeling well since last visit, no new symptoms, balance improving, no falls, will be having ENT f/u soon as well. \par \par 9/14/22 -- Overall continues to be stable/ slowly improve since last visit. No falls. CTD and inflammatory ROS negative today. Remains without actual liver sx.

## 2022-09-14 NOTE — REASON FOR VISIT
[Follow-Up: _____] : a [unfilled] follow-up visit [Home] : at home, [unfilled] , at the time of the visit. [Medical Office: ([facility])___] : at the medical office located in  [Patient] : the patient [Self] : self [FreeTextEntry1] : b/l vestibular hypofunction, ESR/BARNEY/CCP

## 2022-09-15 ENCOUNTER — APPOINTMENT (OUTPATIENT)
Dept: RHEUMATOLOGY | Facility: CLINIC | Age: 27
End: 2022-09-15

## 2022-09-22 ENCOUNTER — TRANSCRIPTION ENCOUNTER (OUTPATIENT)
Age: 27
End: 2022-09-22

## 2022-10-19 ENCOUNTER — TRANSCRIPTION ENCOUNTER (OUTPATIENT)
Age: 27
End: 2022-10-19

## 2022-12-19 ENCOUNTER — APPOINTMENT (OUTPATIENT)
Dept: RHEUMATOLOGY | Facility: CLINIC | Age: 27
End: 2022-12-19
Payer: COMMERCIAL

## 2022-12-19 VITALS
HEART RATE: 91 BPM | HEIGHT: 63 IN | WEIGHT: 226 LBS | OXYGEN SATURATION: 98 % | TEMPERATURE: 96.9 F | BODY MASS INDEX: 40.04 KG/M2 | DIASTOLIC BLOOD PRESSURE: 70 MMHG | SYSTOLIC BLOOD PRESSURE: 120 MMHG

## 2022-12-19 DIAGNOSIS — R76.8 OTHER SPECIFIED ABNORMAL IMMUNOLOGICAL FINDINGS IN SERUM: ICD-10-CM

## 2022-12-19 DIAGNOSIS — R79.89 OTHER SPECIFIED ABNORMAL FINDINGS OF BLOOD CHEMISTRY: ICD-10-CM

## 2022-12-19 DIAGNOSIS — D86.9 SARCOIDOSIS, UNSPECIFIED: ICD-10-CM

## 2022-12-19 DIAGNOSIS — H81.93 UNSPECIFIED DISORDER OF VESTIBULAR FUNCTION, BILATERAL: ICD-10-CM

## 2022-12-19 PROCEDURE — 99213 OFFICE O/P EST LOW 20 MIN: CPT

## 2022-12-19 RX ORDER — FAMOTIDINE 40 MG/1
TABLET, FILM COATED ORAL
Refills: 0 | Status: DISCONTINUED | COMMUNITY
End: 2022-12-19

## 2023-01-12 ENCOUNTER — NON-APPOINTMENT (OUTPATIENT)
Age: 28
End: 2023-01-12

## 2023-01-12 DIAGNOSIS — Z83.438 FAMILY HISTORY OF OTHER DISORDER OF LIPOPROTEIN METABOLISM AND OTHER LIPIDEMIA: ICD-10-CM

## 2023-02-02 PROBLEM — H81.93 VESTIBULOPATHY, BILATERAL: Status: ACTIVE | Noted: 2022-05-26

## 2023-02-02 NOTE — ASSESSMENT
[FreeTextEntry1] : SAMMIE CASTANON is a 27 year old woman with below -- \par \par # b/l vestibular hypofunction with markedly elevated ESR even into April s/p months of sx and continues to clinically improve. Unclear etiology. Downtrending ESR. \par - + BARNEY tho paucity of most common CTD sx \par - + CCP but negative RF and no sx consistent with RA\par - + ACE, normal Vit D 1,25, CT a/p in March with diffuse shotty abd LAD, CXR from Dec reportedly normal --she will get me record of that for review\par - LFTs downtrending, borderline smooth muscle Ab and elevated GGT tho asymptomatic - ?betahistine on top of her prior dx of fatty liver tho this was never dx with imaging -- repeat RUQ US without any etiology and now off betahistine -- will refer to Hepatology for further eval \par - at present no role for medication given improving\par - c/w vestibular PT \par \par # low Vit D\par - resolved, c/w maintenance of 1000 IU daily \par \par RTC in 6 months, sooner if new/worsening inflammatory sx \par

## 2023-02-02 NOTE — PHYSICAL EXAM
[General Appearance - Alert] : alert [General Appearance - In No Acute Distress] : in no acute distress [General Appearance - Well Nourished] : well nourished [Sclera] : the sclera and conjunctiva were normal [PERRL With Normal Accommodation] : pupils were equal in size, round, and reactive to light [Extraocular Movements] : extraocular movements were intact [Oropharynx] : the oropharynx was normal [Neck Appearance] : the appearance of the neck was normal [Auscultation Breath Sounds / Voice Sounds] : lungs were clear to auscultation bilaterally [Heart Rate And Rhythm] : heart rate was normal and rhythm regular [Heart Sounds] : normal S1 and S2 [Murmurs] : no murmurs [Edema] : there was no peripheral edema [Bowel Sounds] : normal bowel sounds [Abdomen Soft] : soft [Abdomen Tenderness] : non-tender [Abdomen Mass (___ Cm)] : no abdominal mass palpated [No CVA Tenderness] : no ~M costovertebral angle tenderness [No Spinal Tenderness] : no spinal tenderness [Abnormal Walk] : normal gait [Nail Clubbing] : no clubbing  or cyanosis of the fingernails [Musculoskeletal - Swelling] : no joint swelling seen [Motor Tone] : muscle strength and tone were normal [] : no rash [No Focal Deficits] : no focal deficits [Oriented To Time, Place, And Person] : oriented to person, place, and time [Impaired Insight] : insight and judgment were intact [Affect] : the affect was normal [FreeTextEntry1] : acne scaring on face

## 2023-02-02 NOTE — HISTORY OF PRESENT ILLNESS
[FreeTextEntry1] : SAMMIE CASTANON is a 26 year old woman who presents with + BARNEY,  and dx of b/l vestibular hypofunction -- Oct 2021 -- cough, regurgitating food, high HR to 110s -- tried to be treated for GERD but then in sx recurred in Dec, also having issues with delayed vision when moving head, dizziness, worsening fatigue, R sided tinnitus with low hearing in upper range. \par \par CT chest - normal \par EGD - negative for GERD\par Supplemented low iron and Vit D \par Ultimately had 60 lb unintentional weight loss, s/p CT abd which was normal - now regaining the weight \par Eventually ended up with b/l tinnitus and muffled hearing \par + Caloric test -- b/l vestibular dysfunction\par Can now eat normal again, no GERD \par PT is helping with gait and hearing is muffled but improved\par Never started on steroids \par \par SLE ROS negative for alopecia, sicca, salivary gland swelling, oral ulcers, malar rash, photosensitivity, SOB, chest pain, serositis, abd pain, dysuria, hematuria, rash, joint AM stiffness/synovitis, hematologic abnormalities, Raynauds. APLS ROS - no pregnancy, no miscarriage, no thrombotic events. \par \par Inflammatory arthritis ROS negative for symmetrical peripheral joint synovitis, prolonged AM stiffness, enthesitis, dactylitis, psoriasis/ rashes, eye inflammation, inflammatory low back pain, IBD. \par \par + R full Achilles tendon rupture in 2021, too late for sx, improved symptomatically \par Negative FH for autoimmune d/o \par \par Labs - as above, improved Vit D\par Negative - dsDNA, TRAVIS, Sjogrens, RF, Scl-70, C3/4\par \par ---------------\par 7/21/22 -- Feeling well since last visit, no new symptoms, balance improving, no falls, will be having ENT f/u soon as well. \par \par 9/14/22 -- Overall continues to be stable/ slowly improve since last visit. No falls. CTD and inflammatory ROS negative today. Remains without actual liver sx. \par \par 12/19/22 -- R hand, wrist, elbow pain intermittently, no synovitis or focal weakness, appears work related, doesn't bother her at night, no radicular sx. Vision, tinnitus, gait has been stable, no falls, plays sports without issues. No RUQ pain in relation to LFTs.

## 2023-02-02 NOTE — DATA REVIEWED
[FreeTextEntry1] : Available notes, and pertinent labs & imaging in EMR reviewed. Pertinent labs and imaging summarized in HPI. Recent labs scanned to chart and reviewed.

## 2023-02-02 NOTE — REASON FOR VISIT
[Follow-Up: _____] : a [unfilled] follow-up visit [FreeTextEntry1] : b/l vestibular hypofunction, ESR/BARNEY/CCP